# Patient Record
Sex: FEMALE | Race: OTHER | HISPANIC OR LATINO | URBAN - METROPOLITAN AREA
[De-identification: names, ages, dates, MRNs, and addresses within clinical notes are randomized per-mention and may not be internally consistent; named-entity substitution may affect disease eponyms.]

---

## 2020-08-22 ENCOUNTER — EMERGENCY (EMERGENCY)
Facility: HOSPITAL | Age: 25
LOS: 1 days | Discharge: HOME | End: 2020-08-22
Admitting: EMERGENCY MEDICINE
Payer: SELF-PAY

## 2020-08-22 VITALS
HEIGHT: 68 IN | SYSTOLIC BLOOD PRESSURE: 117 MMHG | DIASTOLIC BLOOD PRESSURE: 53 MMHG | RESPIRATION RATE: 18 BRPM | OXYGEN SATURATION: 98 % | WEIGHT: 149.91 LBS | HEART RATE: 57 BPM | TEMPERATURE: 98 F

## 2020-08-22 DIAGNOSIS — M79.672 PAIN IN LEFT FOOT: ICD-10-CM
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
## 2020-08-22 DIAGNOSIS — S93.602A UNSPECIFIED SPRAIN OF LEFT FOOT, INITIAL ENCOUNTER: ICD-10-CM
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
## 2020-08-22 PROCEDURE — 73630 X-RAY EXAM OF FOOT: CPT | Mod: 26,LT

## 2020-08-22 PROCEDURE — 99283 EMERGENCY DEPT VISIT LOW MDM: CPT | Mod: 25

## 2020-08-22 NOTE — ED PROVIDER NOTE - CARE PROVIDER_API CALL
Hector Coburn  ORTHOPAEDIC SURGERY  [address], [address] [place] [address]  Phone: [phone]  Fax: [phone]  Follow Up Time:

## 2020-08-22 NOTE — ED PROVIDER NOTE - PHYSICAL EXAMINATION
CONSTITUTIONAL: Well-appearing; well-nourished; in no apparent distress.   	HEAD: Normocephalic; atraumatic.   	EYES:  conjunctiva and sclera clear  	ENT: airway patent  	LLE: sourav inspection, +mild tenderness to distal 3rd metatarsal, skin intact, no ecchymosis. distal pulses intact, no sensory or motor deficits, ambulatory. no ankle tenderness. soft compartments  	NEURO: A & O x 3; face symmetric; grossly unremarkable.   PSYCHOLOGICAL: The patient’s mood and manner are appropriate.

## 2020-08-22 NOTE — ED PROVIDER NOTE - DIAGNOSTIC INTERPRETATION
Interpreted by ED RYLIE Maldonado  left foot xrays 3 views  No fracture, no dislocation, no Foreign Body noted, soft tissue normal

## 2020-08-22 NOTE — ED PROVIDER NOTE - OBJECTIVE STATEMENT
24 yo female c/o left foot injury sustained 1 week ago - states she was drunk 1 week ago, twisted foot at that time, was unable to bear weight first 2 days of injury, now able to ambulatory.

## 2020-08-22 NOTE — ED PROVIDER NOTE - CLINICAL SUMMARY MEDICAL DECISION MAKING FREE TEXT BOX
left foot injury, xrays prelim neg, no neuro/motor deficits, ambulatory, ace wrap, nsaids prn, f/u ortho

## 2020-08-22 NOTE — ED PROVIDER NOTE - PATIENT PORTAL LINK FT
You can access the FollowMyHealth Patient Portal offered by [facility] by registering at the following website: [email or web address]. By joining [employer]’s FollowMyHealth portal, you will also be able to view your health information using other applications (apps) compatible with our system.

## 2020-08-22 NOTE — ED PROVIDER NOTE - NSFOLLOWUPINSTRUCTIONS_ED_ALL_ED_FT
A sprain is a stretch or tear in one of the tough, fiber-like tissues (ligaments) in your body. This is caused by an injury to the area such as a twisting mechanism. Symptoms include pain, swelling, or bruising. Rest that area over the next several days and slowly resume activity when tolerated. Ice can help with swelling and pain.     Take Ibuprofen 600mg every 6-8 hours as needed for pain, take with food, and in addition you may take Tylenol 500 mg every 6-8 hours as needed for pain  Rest. Cool compresses.  Extremity elevation.  Ace wrap     SEEK IMMEDIATE MEDICAL CARE IF YOU HAVE ANY OF THE FOLLOWING SYMPTOMS: worsening pain, inability to move that body part, numbness or tingling.

## 2021-07-21 ENCOUNTER — EMERGENCY (EMERGENCY)
Facility: HOSPITAL | Age: 26
LOS: 1 days | Discharge: HOME | End: 2021-07-21
Admitting: EMERGENCY MEDICINE
Payer: MEDICAID

## 2021-07-21 VITALS
HEIGHT: 68 IN | OXYGEN SATURATION: 98 % | HEART RATE: 82 BPM | RESPIRATION RATE: 16 BRPM | SYSTOLIC BLOOD PRESSURE: 120 MMHG | TEMPERATURE: 98 F | WEIGHT: 160.06 LBS | DIASTOLIC BLOOD PRESSURE: 68 MMHG

## 2021-07-21 VITALS
RESPIRATION RATE: 14 BRPM | OXYGEN SATURATION: 98 % | SYSTOLIC BLOOD PRESSURE: 117 MMHG | HEART RATE: 55 BPM | TEMPERATURE: 98 F | DIASTOLIC BLOOD PRESSURE: 75 MMHG

## 2021-07-21 DIAGNOSIS — R07.89 OTHER CHEST PAIN: ICD-10-CM

## 2021-07-21 DIAGNOSIS — Z91.013 ALLERGY TO SEAFOOD: ICD-10-CM

## 2021-07-21 LAB — HCG UR QL: NEGATIVE — SIGNIFICANT CHANGE UP

## 2021-07-21 PROCEDURE — 99284 EMERGENCY DEPT VISIT MOD MDM: CPT | Mod: 25

## 2021-07-21 PROCEDURE — 71045 X-RAY EXAM CHEST 1 VIEW: CPT | Mod: 26

## 2021-07-21 PROCEDURE — 93010 ELECTROCARDIOGRAM REPORT: CPT

## 2021-07-21 NOTE — ED PROVIDER NOTE - PATIENT PORTAL LINK FT
You can access the FollowMyHealth Patient Portal offered by [facility] by registering at the following website: [email or web address]. By joining "[employer]"’s FollowMyHealth portal, you will also be able to view your health information using other applications (apps) compatible with our system.

## 2021-07-21 NOTE — ED PROVIDER NOTE - OBJECTIVE STATEMENT
27 y/o F with no significant PMHx presents to ED c/o chest discomfort x 3 week - increasing discomfort last week, especially today and yesterday. Pain was initially located in the L breast and she saw her OB/GYN - prescribed naproxen suspecting nerve irritation with some relief. The pain is now intermittent, dull and sharp in nature. Now presents as back pain with R arm radiation, along with numbness, tingling, weakness. Pain has currently resolved but today chest pain occurred at work with exertion. Also notes decreased appetite yesterday and today. Has not taken any other pain medication apart from the prescribed naproxen. Notes eating edible marijuana today.    Pt denies fever, chills, nausea, vomiting, and diarrhea. 25 y/o F with no significant PMHx presents to ED c/o chest discomfort x 3 week - increasing discomfort last week, especially today and yesterday. Pain was initially located in the L breast and she saw her OB/GYN - prescribed naproxen suspecting "nerve irritation" with some relief. The pain is now intermittent, dull and sharp in nature. Now presents as back pain with R arm radiation, along with numbness, tingling, weakness to her legs. Also notes decreased appetite yesterday and today. Has not taken any other pain medication apart from the prescribed naproxen. Notes eating edible marijuana today which alleviated her symptoms. She is currently asymptomatic while here in the ED.    Pt denies fever, chills, nausea, vomiting, and diarrhea.

## 2021-07-21 NOTE — ED PROVIDER NOTE - CLINICAL SUMMARY MEDICAL DECISION MAKING FREE TEXT BOX
EKG and CXR reviewed with no concerning findings. Pt remains asymptomatic and seated comfortably throughout ED stay with no other complaints at this time. Will D/C with supportive care instructions and to F/U with Cardiology this week. Strict return precautions reviewed with pt in which pt verbalizes understanding and agrees to.

## 2021-07-21 NOTE — ED PROVIDER NOTE - NSFOLLOWUPINSTRUCTIONS_ED_ALL_ED_FT
Pt here for port flush today  Denies any needs for labs today,no orders in computer  Port flushed per protocol and next appt made, AVS provided 
Chest Pain    Chest pain can be caused by many different conditions which may or may not be dangerous. Causes include heartburn, lung infections, heart attack, blood clot in lungs, skin infections, strain or damage to muscle, cartilage, or bones, etc. In addition to a history and physical examination, an electrocardiogram (ECG) or other lab tests may have been performed to determine the cause of your chest pain. Follow up with your primary care provider or with a cardiologist as instructed.     Please follow up with Dr. Lala (Cardiologist) this week for further evaluation as discussed.    RETURN TO THE ER IMMEDIATELY IF YOU HAVE ANY OF THE FOLLOWING SYMPTOMS: worsening chest pain, coughing up blood, unexplained back/neck/jaw pain, severe abdominal pain, dizziness or lightheadedness, fainting, shortness of breath, sweaty or clammy skin, vomiting, or racing heart beat. These symptoms may represent a serious problem that is an emergency. Do not wait to see if the symptoms will go away. Get medical help right away. Call 911 and do not drive yourself to the hospital.

## 2021-07-21 NOTE — ED ADULT TRIAGE NOTE - CHIEF COMPLAINT QUOTE
Pt presents c/o intermittent CP x1 month.  Denies exacerbation during exercise.  Pt denies symptoms at this time. Pt endorses dizziness when she feels her CP.  Denies cough, fever/chills.  Pt has hx of COVID-19 in March of 2020.

## 2021-07-21 NOTE — ED PROVIDER NOTE - CARE PROVIDER_API CALL
Nahid Lala)  Cardiovascular Disease  [address] [facility] [place] [address]  Phone: [phone]  Fax: [phone]  Follow Up Time: 1-3 Days